# Patient Record
Sex: MALE | Race: WHITE | NOT HISPANIC OR LATINO | Employment: OTHER | RURAL
[De-identification: names, ages, dates, MRNs, and addresses within clinical notes are randomized per-mention and may not be internally consistent; named-entity substitution may affect disease eponyms.]

---

## 2018-05-10 ENCOUNTER — CONVERSION ENCOUNTER (OUTPATIENT)
Dept: CARDIOLOGY | Facility: CLINIC | Age: 63
End: 2018-05-10

## 2018-05-10 ENCOUNTER — OFFICE VISIT CONVERTED (OUTPATIENT)
Dept: CARDIOLOGY | Facility: CLINIC | Age: 63
End: 2018-05-10
Attending: SPECIALIST

## 2018-11-08 ENCOUNTER — OFFICE VISIT CONVERTED (OUTPATIENT)
Dept: CARDIOLOGY | Facility: CLINIC | Age: 63
End: 2018-11-08
Attending: SPECIALIST

## 2019-05-09 ENCOUNTER — OFFICE VISIT CONVERTED (OUTPATIENT)
Dept: CARDIOLOGY | Facility: CLINIC | Age: 64
End: 2019-05-09
Attending: SPECIALIST

## 2019-05-09 ENCOUNTER — CONVERSION ENCOUNTER (OUTPATIENT)
Dept: CARDIOLOGY | Facility: CLINIC | Age: 64
End: 2019-05-09

## 2019-11-21 ENCOUNTER — CONVERSION ENCOUNTER (OUTPATIENT)
Dept: CARDIOLOGY | Facility: CLINIC | Age: 64
End: 2019-11-21

## 2019-11-21 ENCOUNTER — OFFICE VISIT CONVERTED (OUTPATIENT)
Dept: CARDIOLOGY | Facility: CLINIC | Age: 64
End: 2019-11-21
Attending: SPECIALIST

## 2020-09-10 ENCOUNTER — OFFICE VISIT CONVERTED (OUTPATIENT)
Dept: CARDIOLOGY | Facility: CLINIC | Age: 65
End: 2020-09-10
Attending: SPECIALIST

## 2020-09-10 ENCOUNTER — CONVERSION ENCOUNTER (OUTPATIENT)
Dept: OTHER | Facility: HOSPITAL | Age: 65
End: 2020-09-10

## 2021-03-25 ENCOUNTER — CONVERSION ENCOUNTER (OUTPATIENT)
Dept: OTHER | Facility: HOSPITAL | Age: 66
End: 2021-03-25

## 2021-03-25 ENCOUNTER — OFFICE VISIT CONVERTED (OUTPATIENT)
Dept: CARDIOLOGY | Facility: CLINIC | Age: 66
End: 2021-03-25
Attending: SPECIALIST

## 2021-05-13 NOTE — PROGRESS NOTES
"   Progress Note      Patient Name: Chong Ortiz   Patient ID: [id number]   Sex: Male   YOB: 1955    Primary Care Provider: Neeta Staples MD   Referring Provider: Neeta Staples MD    Visit Date: September 10, 2020    Provider: Kalpesh Dallas MD   Location: [facility]   Location Address: [address] [place] [address]  [id number]   Location Phone: [phone]          Chief Complaint  · Atril fibrillation  · Hypertension      History Of Present Illness  Deep Ortiz is a 65 year old /White male with a history of permanent atrial fibrillation. Patient denies chest pain. No shortness of breath, PND or orthopnea. No palpitations. Blood pressure running high recently.   Medications  Meds at present include: Metoprolol 25 mg qd; ASA 81 mg qd.   PAST MEDICAL HISTORY: negative diabetes, hypertension and chest pain.   FAMILY HISTORY: positive for diabetes and hypertension; negative for heart disease.   PSYCHO/SOCIAL HISTORY: negative for depression; he does drink alcohol moderately and does not smoke.       Review of Systems  · Cardiovascular  o Denies  o : palpitations (fast, fluttering, or skipping beats), swelling (feet, ankles, hands), shortness of breath while walking or lying flat, chest pain or angina pectoris   · Respiratory  o Denies  o : chronic or frequent cough, asthma or wheezing      Vitals  Date Time BP Position Site L\R Cuff Size HR RR TEMP (F) WT  HT  BMI kg/m2 BSA m2 O2 Sat HC       09/10/2020 02:24 /102 Sitting    89 - R   219lbs 4oz 5'  10\" 31.46 2.22     09/10/2020 02:24 /97 Sitting    89 - R   219lbs 4oz 5'  10\" 31.46 2.22           Physical Examination  · Constitutional  o Appearance  o : Alert and Oriented X3. The patient is obese.   · Respiratory  o Inspection of Chest  o : No chest wall deformities, moving equal  o Auscultation of Lungs  o : Good air entry with vesicular breath sounds  · Cardiovascular  o Heart  o :   § Auscultation of " Heart  § : S1 and S2 are regular. No S3. No S4. No murmurs.  o Peripheral Vascular System  o :   § Extremities  § : Peripheral pulses were well felt. No edema. No cyanosis.  · Gastrointestinal  o Abdominal Examination  o : No masses or tenderness noted.   · EKG  o EKG  o : was not performed in the office today          Assessment     IMPRESSION/PLAN    1. Permanent atrial firbillation with a controlled heart rate. Chads Vasc score 1.   Patient has developed hypertension with increased Chads Vasc score. I discussed with the patient at length about risks and benefits of  anticoagulation including risk of CVA. He understands the risk and still refuses anticoagulation and will think about it.   Continue current dose of Metoprolol and Aspirin.    2. Essential hypertension uncontrolled.  Monitor his blood pressure regularly and be on a low salt diet. If his blood pressure remains high he will need to be on an antihypertensive agent. He will call me back with his blood pressure readings in the next few weeks.   3. He will see me back in 6 months.      MD SHA Freitas/wt       Plan  · Instructions  o This note was transcribed by Perla Good. SHA/wt  o The above service was transcribed by Perla Good on my behalf and I attest to the accuracy of the note. SHA            Electronically Signed by: Samira Good-, -Author on September 11, 2020 12:33:19 PM  Electronically Co-signed by: Kalpesh Dallas MD -Reviewer on September 12, 2020 09:37:39 AM

## 2021-05-14 VITALS
HEIGHT: 70 IN | DIASTOLIC BLOOD PRESSURE: 102 MMHG | WEIGHT: 219.25 LBS | HEART RATE: 89 BPM | SYSTOLIC BLOOD PRESSURE: 152 MMHG | BODY MASS INDEX: 31.39 KG/M2

## 2021-05-14 VITALS
WEIGHT: 222.25 LBS | SYSTOLIC BLOOD PRESSURE: 136 MMHG | HEIGHT: 73 IN | BODY MASS INDEX: 29.46 KG/M2 | HEART RATE: 77 BPM | DIASTOLIC BLOOD PRESSURE: 92 MMHG

## 2021-05-14 NOTE — PROGRESS NOTES
"   Progress Note      Patient Name: Chong Ortiz   Patient ID: [id number]   Sex: Male   YOB: 1955    Primary Care Provider: Neeta Staples MD   Referring Provider: Neeta Staples MD    Visit Date: March 25, 2021    Provider: Kalpesh Dallas MD   Location: [facility]   Location Address: [address] [place] [address]  [id number]   Location Phone: [phone]          Chief Complaint  · Atril fibrillation  · Hypertension      History Of Present Illness  Deep Ortiz is a 66 year old /White male with a history of permanent atrial fibrillation. Patient denies chest pain. No shortness of breath, PND or orthopnea. No palpitations.   Medications  Meds at present include: Metoprolol 25 mg qd; ASA 81 mg qd; Simvastatin 20 mg qd.   PAST MEDICAL HISTORY: negative diabetes, hypertension and chest pain. Positive for atrial fibrillation, hyperlipidemia.   PSYCHO/SOCIAL HISTORY: he does drink alcohol moderately and does not smoke.       Review of Systems  · Cardiovascular  o Denies  o : palpitations (fast, fluttering, or skipping beats), swelling (feet, ankles, hands), shortness of breath while walking or lying flat, chest pain or angina pectoris   · Respiratory  o Denies  o : chronic or frequent cough, asthma or wheezing      Vitals  Date Time BP Position Site L\R Cuff Size HR RR TEMP (F) WT  HT  BMI kg/m2 BSA m2 O2 Sat FR L/min FiO2 HC       03/25/2021 03:10 /92 Sitting    77 - R   222lbs 4oz 6'  1\" 29.32 2.28             Physical Examination  · Constitutional  o Appearance  o : Alert and Oriented X3. The patient is obese.   · Respiratory  o Inspection of Chest  o : No chest wall deformities, moving equal  o Auscultation of Lungs  o : Good air entry with vesicular breath sounds  · Cardiovascular  o Heart  o :   § Auscultation of Heart  § : S1 and S2 are regular. No S3. No S4. No murmurs.  o Peripheral Vascular System  o :   § Extremities  § : Peripheral pulses were well felt. No " edema. No cyanosis.  · Gastrointestinal  o Abdominal Examination  o : No masses or tenderness noted.   · EKG  o EKG  o : was not performed in the office today          Assessment     IMPRESSION/PLAN    1. Permanent atrial firbillation with a controlled heart rate. Chads Vasc score 1.   I discussed with the patient again at length about risks and benefits of anticoagulation including risk of CVA. He understands the risk and still refuses anticoagulation.   Continue current dose of Metoprolol and Aspirin.    2. Essential hypertension uncontrolled.  Monitor his blood pressure regularly and be on a low salt diet. Continue current dose of Metoprolol.   3. He will see me back in 6 months.      MD SHA Freitas/wt       Plan  · Instructions  o This note was transcribed by Perla Good. SHA/wt  o The above service was transcribed by Perla Good on my behalf and I attest to the accuracy of the note. SHA            Electronically Signed by: Samira Good-, -Author on March 26, 2021 11:44:20 AM  Electronically Co-signed by: Kalpesh Dallas MD -Reviewer on March 29, 2021 09:28:24 AM

## 2021-05-15 VITALS — SYSTOLIC BLOOD PRESSURE: 133 MMHG | HEART RATE: 84 BPM | HEIGHT: 73 IN | DIASTOLIC BLOOD PRESSURE: 97 MMHG

## 2021-05-15 VITALS
HEART RATE: 82 BPM | BODY MASS INDEX: 28.51 KG/M2 | HEIGHT: 73 IN | SYSTOLIC BLOOD PRESSURE: 132 MMHG | WEIGHT: 215.12 LBS | DIASTOLIC BLOOD PRESSURE: 93 MMHG

## 2021-05-16 VITALS
DIASTOLIC BLOOD PRESSURE: 93 MMHG | HEART RATE: 64 BPM | WEIGHT: 205 LBS | BODY MASS INDEX: 27.17 KG/M2 | SYSTOLIC BLOOD PRESSURE: 138 MMHG | HEIGHT: 73 IN

## 2021-05-16 VITALS
SYSTOLIC BLOOD PRESSURE: 139 MMHG | BODY MASS INDEX: 28.01 KG/M2 | HEIGHT: 73 IN | HEART RATE: 108 BPM | WEIGHT: 211.37 LBS | DIASTOLIC BLOOD PRESSURE: 96 MMHG

## 2021-11-03 PROBLEM — I48.21 PERMANENT ATRIAL FIBRILLATION (HCC): Status: ACTIVE | Noted: 2021-11-03

## 2021-11-03 PROBLEM — I10 HYPERTENSION, ESSENTIAL: Status: ACTIVE | Noted: 2021-11-03

## 2021-11-03 NOTE — PROGRESS NOTES
[facility]  Cardiology progress Note    Patient Name: Chong Ortiz  : 1955    CHIEF COMPLAINT  Atrial fibrillation.      Subjective   Subjective     HISTORY OF PRESENT ILLNESS    Chong Ortiz is a 66 y.o. male see of permanent atrial fibrillation.  Denies any chest pain or shortness of breath.  No palpitations.    Review of Systems:   Constitutional no fever,  no weight loss   Skin no rash   Otolaryngeal no difficulty swallowing   Cardiovascular See HPI   Pulmonary no cough, no sputum production   Gastrointestinal no constipation, no diarrhea   Genitourinary no dysuria, no hematuria   Hematologic no easy bruisability, no abnormal bleeding   Musculoskeletal no muscle pain   Neurologic no dizziness, no falls         Personal History     Social History:  reports that he has never smoked. He has never used smokeless tobacco. He reports current alcohol use. He reports that he does not use drugs.    Home Medications:  Current Outpatient Medications on File Prior to Visit   Medication Sig   • aspirin 81 MG EC tablet Take 81 mg by mouth Daily.   • metoprolol succinate XL (TOPROL-XL) 25 MG 24 hr tablet Take 25 mg by mouth Daily.   • simvastatin (ZOCOR) 20 MG tablet Take 20 mg by mouth every night at bedtime.     No current facility-administered medications on file prior to visit.     Allergies:  No Known Allergies    Objective    Objective       Vitals:   Heart Rate:  [79-97] 79  BP: (134-151)/(106-112) 134/106  Body mass index is 29.82 kg/m².     Physical Exam:   Constitutional: Awake, alert, No acute distress    Eyes: PERRLA, sclerae anicteric, no conjunctival injection   HENT: NCAT, mucous membranes moist   Neck: Supple, no thyromegaly, no lymphadenopathy, trachea midline   Respiratory: Clear to auscultation bilaterally, nonlabored respirations    Cardiovascular: S1-S2 irregular, no murmurs or rubs. Palpable pedal pulses bilaterally   Musculoskeletal: No bilateral ankle edema, no cyanosis to  extremities   Psychiatric: Appropriate affect, cooperative   Neurologic: Oriented x 3, strength symmetric in all extremities, Cranial Nerves grossly intact to confrontation, speech clear   Skin: No rashes.    Result Review    Result Review:  I have personally reviewed the available results from  [x]  Laboratory  [x]  EKG  [x]  Cardiology  [x]  Medications  [x]  Old records  []  Other:   Procedures      Impression/Plan:  1.  Permanent atrial fibrillation controlled heart rate: Still refuses anticoagulation.  Discussed with him at length about anticoagulation.  Understands all risks and benefits.  Including the risk of CVA.  Continue Toprol-XL 25 mg once a day.  2.  Essential hypertension controlled: Continue Toprol-XL 25 mg once a day.  Blood pressure well controlled at home.  3.  Hyperlipidemia: Continue simvastatin 20 mg once a day.  Able to tolerate simvastatin without any side effects.        Kalpesh Dallas MD   11/04/21   12:53 EDT

## 2021-11-04 ENCOUNTER — OFFICE VISIT (OUTPATIENT)
Dept: CARDIOLOGY | Facility: CLINIC | Age: 66
End: 2021-11-04

## 2021-11-04 VITALS
DIASTOLIC BLOOD PRESSURE: 106 MMHG | HEIGHT: 73 IN | BODY MASS INDEX: 29.95 KG/M2 | WEIGHT: 226 LBS | HEART RATE: 79 BPM | SYSTOLIC BLOOD PRESSURE: 134 MMHG

## 2021-11-04 DIAGNOSIS — I10 HYPERTENSION, ESSENTIAL: ICD-10-CM

## 2021-11-04 DIAGNOSIS — I48.21 PERMANENT ATRIAL FIBRILLATION (HCC): Primary | ICD-10-CM

## 2021-11-04 PROCEDURE — 99214 OFFICE O/P EST MOD 30 MIN: CPT | Performed by: SPECIALIST

## 2021-11-04 RX ORDER — SIMVASTATIN 20 MG
20 TABLET ORAL
COMMUNITY
Start: 2021-10-04

## 2021-11-04 RX ORDER — METOPROLOL SUCCINATE 25 MG/1
25 TABLET, EXTENDED RELEASE ORAL DAILY
COMMUNITY
End: 2022-03-09 | Stop reason: SDUPTHER

## 2021-11-04 RX ORDER — ASPIRIN 81 MG/1
81 TABLET ORAL DAILY
COMMUNITY
End: 2023-02-23 | Stop reason: ALTCHOICE

## 2022-03-09 RX ORDER — METOPROLOL SUCCINATE 25 MG/1
25 TABLET, EXTENDED RELEASE ORAL DAILY
Qty: 90 TABLET | Refills: 2 | Status: SHIPPED | OUTPATIENT
Start: 2022-03-09 | End: 2022-06-08 | Stop reason: SDUPTHER

## 2022-06-08 RX ORDER — METOPROLOL SUCCINATE 25 MG/1
25 TABLET, EXTENDED RELEASE ORAL DAILY
Qty: 90 TABLET | Refills: 0 | Status: SHIPPED | OUTPATIENT
Start: 2022-06-08

## 2022-06-20 NOTE — PROGRESS NOTES
[facility]  Cardiology progress Note    Patient Name: Chong Ortiz  : 1955    CHIEF COMPLAINT  Atrial fibrillation      Subjective   Subjective     HISTORY OF PRESENT ILLNESS    Chong Ortiz is a 67 y.o. male with history of atrial fibrillation.  No palpitations.  No chest pain    Review of Systems:   Constitutional no fever,  no weight loss   Skin no rash   Otolaryngeal no difficulty swallowing   Cardiovascular See HPI   Pulmonary no cough, no sputum production   Gastrointestinal no constipation, no diarrhea   Genitourinary no dysuria, no hematuria   Hematologic no easy bruisability, no abnormal bleeding   Musculoskeletal no muscle pain   Neurologic no dizziness, no falls         Personal History     Social History:  reports that he has never smoked. He has never used smokeless tobacco. He reports current alcohol use. He reports that he does not use drugs.    Home Medications:  Current Outpatient Medications on File Prior to Visit   Medication Sig   • aspirin 81 MG EC tablet Take 81 mg by mouth Daily.   • metoprolol succinate XL (TOPROL-XL) 25 MG 24 hr tablet Take 1 tablet by mouth Daily.   • simvastatin (ZOCOR) 20 MG tablet Take 20 mg by mouth every night at bedtime.     No current facility-administered medications on file prior to visit.     Allergies:  No Known Allergies    Objective    Objective       Vitals:   Heart Rate:  [96] 96  BP: (136)/(96) 136/96  Body mass index is 28.37 kg/m².     Physical Exam:   Constitutional: Awake, alert, No acute distress    Eyes: PERRLA, sclerae anicteric, no conjunctival injection   HENT: NCAT, mucous membranes moist   Neck: Supple, no thyromegaly, no lymphadenopathy, trachea midline   Respiratory: Clear to auscultation bilaterally, nonlabored respirations    Cardiovascular: Heart sounds irregular, no murmurs or rubs. Palpable pedal pulses bilaterally   Musculoskeletal: No bilateral ankle edema, no cyanosis to extremities   Psychiatric: Appropriate  affect, cooperative   Neurologic: Oriented x 3, strength symmetric in all extremities, Cranial Nerves grossly intact to confrontation, speech clear   Skin: No rashes.    Result Review    Result Review:  I have personally reviewed the available results from  [x]  Laboratory  [x]  EKG  [x]  Cardiology  [x]  Medications  [x]  Old records  []  Other:   Procedures    Impression/Plan:  1.  Permanent atrial fibrillation controlled heart rate: Still refuses anticoagulation.  Discussed with him at length about anticoagulation.  Understands all risks and benefits.  Including the risk of CVA.  Continue Toprol-XL 25 mg once a day.  Continue aspirin 81 mg once a day.  2.  Essential hypertension controlled: Continue Toprol-XL 25 mg once a day.  Blood pressure well controlled at home.  3.  Hyperlipidemia: Continue simvastatin 20 mg once a day.  Able to tolerate simvastatin without any side effects.           Kalpesh Dallas MD   06/23/22   10:51 EDT

## 2022-06-23 ENCOUNTER — OFFICE VISIT (OUTPATIENT)
Dept: CARDIOLOGY | Facility: CLINIC | Age: 67
End: 2022-06-23

## 2022-06-23 VITALS
HEART RATE: 96 BPM | BODY MASS INDEX: 28.49 KG/M2 | HEIGHT: 73 IN | DIASTOLIC BLOOD PRESSURE: 96 MMHG | SYSTOLIC BLOOD PRESSURE: 136 MMHG | WEIGHT: 215 LBS

## 2022-06-23 DIAGNOSIS — I10 HYPERTENSION, ESSENTIAL: ICD-10-CM

## 2022-06-23 DIAGNOSIS — I48.21 PERMANENT ATRIAL FIBRILLATION: Primary | ICD-10-CM

## 2022-06-23 DIAGNOSIS — E78.2 HYPERLIPEMIA, MIXED: ICD-10-CM

## 2022-06-23 PROCEDURE — 99214 OFFICE O/P EST MOD 30 MIN: CPT | Performed by: SPECIALIST

## 2022-08-02 ENCOUNTER — TELEPHONE (OUTPATIENT)
Dept: CARDIOLOGY | Facility: CLINIC | Age: 67
End: 2022-08-02

## 2023-02-20 NOTE — PROGRESS NOTES
[facility]  Cardiology progress Note    Patient Name: Chong Ortiz  : 1955    CHIEF COMPLAINT  Atrial fibrillation        Subjective   Subjective     HISTORY OF PRESENT ILLNESS    hCong Ortiz is a 67 y.o. male with history of permanent atrial fibrillation.  No chest pain or shortness of breath.    REVIEW OF SYSTEMS    Constitutional:    No fever, no weight loss  Skin:     No rash  Otolaryngeal:    No difficulty swallowing  Cardiovascular: See HPI.  Pulmonary:    No cough, no sputum production    Personal History     Social History:    reports that he has never smoked. He has never used smokeless tobacco. He reports current alcohol use. He reports that he does not use drugs.    Home Medications:  Current Outpatient Medications on File Prior to Visit   Medication Sig   • apixaban (ELIQUIS) 5 MG tablet tablet Take 1 tablet by mouth 2 (Two) Times a Day.   • metoprolol succinate XL (TOPROL-XL) 25 MG 24 hr tablet Take 1 tablet by mouth Daily. (Patient taking differently: Take  by mouth Daily.)   • simvastatin (ZOCOR) 20 MG tablet Take 20 mg by mouth every night at bedtime.   • [DISCONTINUED] aspirin 81 MG EC tablet Take 81 mg by mouth Daily.     No current facility-administered medications on file prior to visit.       Past Medical History:   Diagnosis Date   • Atrial fibrillation (HCC)    • Hyperlipidemia    • Hypertension        Allergies:  No Known Allergies    Objective    Objective       Vitals:   Heart Rate:  [68] 68  BP: (136)/(92) 136/92  Body mass index is 28.63 kg/m².     PHYSICAL EXAM:    General Appearance:   · well developed  · well nourished  HENT:   · oropharynx moist  · lips not cyanotic  Neck:  · thyroid not enlarged  · supple  Respiratory:  · no respiratory distress  · normal breath sounds  · no rales  Cardiovascular:  · no jugular venous distention  · regular rhythm  · apical impulse normal  · S1 normal, S2 normal  · no S3, no S4   · no murmur  · no rub, no thrill  · carotid  pulses normal; no bruit  · pedal pulses normal  · lower extremity edema: none    Skin:   · warm, dry  Psychiatric:  · judgement and insight appropriate  · normal mood and affect        Result Review:  I have personally reviewed the available results from  [x]  Laboratory  [x]  EKG  [x]  Cardiology  [x]  Medications  [x]  Old records  []  Other:     Procedures    Impression/Plan:  1.  Permanent atrial fibrillation with a controlled heart rate: Continue Eliquis 5 mg twice a day for stroke prevention.  Continue Toprol-XL 25 mg once a day.  Able to tolerate Eliquis 20 side effect such as bleeding.  2.  Essential hypertension controlled: Continue Toprol-XL 25 mg once a day.  Monitor blood pressure regularly.  3.  Hyperlipidemia: Continue simvastatin 20 mg once a day.           Kalpesh Dallas MD   02/23/23   11:53 EST

## 2023-02-23 ENCOUNTER — OFFICE VISIT (OUTPATIENT)
Dept: CARDIOLOGY | Facility: CLINIC | Age: 68
End: 2023-02-23
Payer: MEDICARE

## 2023-02-23 VITALS
SYSTOLIC BLOOD PRESSURE: 136 MMHG | HEIGHT: 73 IN | WEIGHT: 217 LBS | BODY MASS INDEX: 28.76 KG/M2 | DIASTOLIC BLOOD PRESSURE: 92 MMHG | HEART RATE: 68 BPM

## 2023-02-23 DIAGNOSIS — I48.21 PERMANENT ATRIAL FIBRILLATION: ICD-10-CM

## 2023-02-23 DIAGNOSIS — E78.2 HYPERLIPEMIA, MIXED: ICD-10-CM

## 2023-02-23 DIAGNOSIS — I10 HYPERTENSION, ESSENTIAL: Primary | ICD-10-CM

## 2023-02-23 PROCEDURE — 99214 OFFICE O/P EST MOD 30 MIN: CPT | Performed by: SPECIALIST

## 2023-08-25 NOTE — PROGRESS NOTES
[facility]  Cardiology progress Note    Patient Name: Chong Ortiz  : 1955    CHIEF COMPLAINT  Atrial fibrillation        Subjective   Subjective     HISTORY OF PRESENT ILLNESS    Chong Ortiz is a 68 y.o. male with history of atrial fibrillation hypertension.  No palpitations.    REVIEW OF SYSTEMS    Constitutional:    No fever, no weight loss  Skin:     No rash  Otolaryngeal:    No difficulty swallowing  Cardiovascular: See HPI.  Pulmonary:    No cough, no sputum production    Personal History     Social History:    reports that he has never smoked. He has never used smokeless tobacco. He reports current alcohol use. He reports that he does not use drugs.    Home Medications:  Current Outpatient Medications on File Prior to Visit   Medication Sig    apixaban (ELIQUIS) 5 MG tablet tablet Take 1 tablet by mouth 2 (Two) Times a Day.    metoprolol succinate XL (TOPROL-XL) 25 MG 24 hr tablet Take 1 tablet by mouth Daily. (Patient taking differently: Take  by mouth Daily.)    simvastatin (ZOCOR) 20 MG tablet Take 1 tablet by mouth every night at bedtime.     No current facility-administered medications on file prior to visit.       Past Medical History:   Diagnosis Date    Atrial fibrillation     Hyperlipidemia     Hypertension        Allergies:  No Known Allergies    Objective    Objective       Vitals:   Heart Rate:  [79] 79  BP: (122)/(94) 122/94  Body mass index is 28.63 kg/mý.     PHYSICAL EXAM:    General Appearance:   well developed  well nourished  HENT:   oropharynx moist  lips not cyanotic  Neck:  thyroid not enlarged  supple  Respiratory:  no respiratory distress  normal breath sounds  no rales  Cardiovascular:  no jugular venous distention  regular rhythm  apical impulse normal  S1 normal, S2 normal  no S3, no S4   no murmur  no rub, no thrill  carotid pulses normal; no bruit  pedal pulses normal  lower extremity edema: none    Skin:   warm, dry  Psychiatric:  judgement and insight  appropriate  normal mood and affect        Result Review:  I have personally reviewed the available results from  [x]  Laboratory  [x]  EKG  [x]  Cardiology  [x]  Medications  [x]  Old records  []  Other:     Procedures       Impression/Plan:  1.  Essential hypertension controlled: Continue Toprol-XL 25 mg once a day.  Monitor blood pressure regularly.  2.  Hyperlipidemia: Continue simvastatin 20 mg once a day.  Monitor lipid and hepatic profile.  3.  Permanent atrial fibrillation with a controlled heart rate: Continue Eliquis 5 mg twice a day for stroke prevention.  Continue Toprol-XL 25 mg once a day.  To tolerate these medicines without any side effects.  No history of bleeding.           Kalpesh Dallas MD   08/28/23   12:54 EDT

## 2023-08-28 ENCOUNTER — OFFICE VISIT (OUTPATIENT)
Dept: CARDIOLOGY | Facility: CLINIC | Age: 68
End: 2023-08-28
Payer: MEDICARE

## 2023-08-28 VITALS
WEIGHT: 217 LBS | DIASTOLIC BLOOD PRESSURE: 94 MMHG | HEART RATE: 79 BPM | HEIGHT: 73 IN | SYSTOLIC BLOOD PRESSURE: 122 MMHG | BODY MASS INDEX: 28.76 KG/M2

## 2023-08-28 DIAGNOSIS — I10 HYPERTENSION, ESSENTIAL: Primary | ICD-10-CM

## 2023-08-28 DIAGNOSIS — I48.21 PERMANENT ATRIAL FIBRILLATION: ICD-10-CM

## 2023-08-28 DIAGNOSIS — E78.2 HYPERLIPEMIA, MIXED: ICD-10-CM

## 2023-08-28 PROCEDURE — 99214 OFFICE O/P EST MOD 30 MIN: CPT | Performed by: SPECIALIST

## 2023-08-28 PROCEDURE — 1159F MED LIST DOCD IN RCRD: CPT | Performed by: SPECIALIST

## 2023-08-28 PROCEDURE — 3080F DIAST BP >= 90 MM HG: CPT | Performed by: SPECIALIST

## 2023-08-28 PROCEDURE — 3074F SYST BP LT 130 MM HG: CPT | Performed by: SPECIALIST

## 2023-08-28 PROCEDURE — 1160F RVW MEDS BY RX/DR IN RCRD: CPT | Performed by: SPECIALIST

## 2023-09-12 RX ORDER — APIXABAN 5 MG/1
TABLET, FILM COATED ORAL
Qty: 180 TABLET | Refills: 3 | Status: SHIPPED | OUTPATIENT
Start: 2023-09-12

## 2024-03-11 NOTE — PROGRESS NOTES
[facility]  Cardiology progress Note    Patient Name: Chong Ortiz  : 1955    CHIEF COMPLAINT  Atrial fibrillation        Subjective   Subjective     HISTORY OF PRESENT ILLNESS    Chong Ortiz is a 68 y.o. male with history of atrial fibrillation and hypertension.  No palpitations    REVIEW OF SYSTEMS    Constitutional:    No fever, no weight loss  Skin:     No rash  Otolaryngeal:    No difficulty swallowing  Cardiovascular: See HPI.  Pulmonary:    No cough, no sputum production    Personal History     Social History:    reports that he has never smoked. He has never used smokeless tobacco. He reports current alcohol use. He reports that he does not use drugs.    Home Medications:  Current Outpatient Medications on File Prior to Visit   Medication Sig    Eliquis 5 MG tablet tablet TAKE ONE TABLET BY MOUTH TWICE DAILY    metoprolol succinate XL (TOPROL-XL) 25 MG 24 hr tablet Take 1 tablet by mouth Daily. (Patient taking differently: Take  by mouth Daily.)    simvastatin (ZOCOR) 20 MG tablet Take 1 tablet by mouth every night at bedtime.     No current facility-administered medications on file prior to visit.       Past Medical History:   Diagnosis Date    Atrial fibrillation     Hyperlipidemia     Hypertension        Allergies:  No Known Allergies    Objective    Objective       Vitals:   Heart Rate:  [66] 66  BP: (114)/(75) 114/75  Body mass index is 29.69 kg/m².     PHYSICAL EXAM:    General Appearance:   well developed  well nourished  HENT:   oropharynx moist  lips not cyanotic  Neck:  thyroid not enlarged  supple  Respiratory:  no respiratory distress  normal breath sounds  no rales  Cardiovascular:  no jugular venous distention  regular rhythm  apical impulse normal  S1 normal, S2 normal  no S3, no S4   no murmur  no rub, no thrill  carotid pulses normal; no bruit  pedal pulses normal  lower extremity edema: none    Skin:   warm, dry  Psychiatric:  judgement and insight  appropriate  normal mood and affect        Result Review:  I have personally reviewed the available results from  [x]  Laboratory  [x]  EKG  [x]  Cardiology  [x]  Medications  [x]  Old records  []  Other:     Procedures    Impression/Plan:  1.  Permanent atrial fibrillation controlled: Continue Eliquis 5 mg twice a day for stroke prevention.    Continue Toprol-XL 25 mg once a day.  2.  Mixed hyperlipidemia: Continue simvastatin 20 mg once a day.  Monitor lipid and hepatic profile.  3.  Essential hypertension controlled: Continue Toprol-XL 25 mg once a day.  Monitor blood pressure daily.           Kalpesh Dallas MD   03/14/24   13:28 EDT

## 2024-03-12 ENCOUNTER — TELEPHONE (OUTPATIENT)
Dept: CARDIOLOGY | Facility: CLINIC | Age: 69
End: 2024-03-12
Payer: MEDICARE

## 2024-03-12 NOTE — TELEPHONE ENCOUNTER
RECEIVED JU FROM JENNY WITH [facility] THAT PATIENT HAS BEEN AUTHORIZED FOR CONTINUITY OF CARE WITH DR. CARDONA. AUTH NUM [id number]. ANY QUESTIONS CAN BE DIRECTED [phone].

## 2024-03-14 ENCOUNTER — OFFICE VISIT (OUTPATIENT)
Dept: CARDIOLOGY | Facility: CLINIC | Age: 69
End: 2024-03-14
Payer: MEDICARE

## 2024-03-14 VITALS
WEIGHT: 225 LBS | BODY MASS INDEX: 29.82 KG/M2 | HEART RATE: 66 BPM | DIASTOLIC BLOOD PRESSURE: 75 MMHG | HEIGHT: 73 IN | SYSTOLIC BLOOD PRESSURE: 114 MMHG

## 2024-03-14 DIAGNOSIS — I10 HYPERTENSION, ESSENTIAL: Primary | ICD-10-CM

## 2024-03-14 DIAGNOSIS — I48.21 PERMANENT ATRIAL FIBRILLATION: ICD-10-CM

## 2024-03-14 DIAGNOSIS — E78.2 HYPERLIPEMIA, MIXED: ICD-10-CM

## 2024-03-14 PROCEDURE — 99214 OFFICE O/P EST MOD 30 MIN: CPT | Performed by: SPECIALIST

## 2024-03-14 PROCEDURE — 3074F SYST BP LT 130 MM HG: CPT | Performed by: SPECIALIST

## 2024-03-14 PROCEDURE — 3078F DIAST BP <80 MM HG: CPT | Performed by: SPECIALIST

## 2024-03-14 PROCEDURE — 1160F RVW MEDS BY RX/DR IN RCRD: CPT | Performed by: SPECIALIST

## 2024-03-14 PROCEDURE — 1159F MED LIST DOCD IN RCRD: CPT | Performed by: SPECIALIST

## 2024-09-16 ENCOUNTER — OFFICE VISIT (OUTPATIENT)
Dept: CARDIOLOGY | Facility: CLINIC | Age: 69
End: 2024-09-16
Payer: MEDICARE

## 2024-09-16 VITALS
WEIGHT: 223 LBS | DIASTOLIC BLOOD PRESSURE: 92 MMHG | HEART RATE: 70 BPM | HEIGHT: 73 IN | SYSTOLIC BLOOD PRESSURE: 144 MMHG | BODY MASS INDEX: 29.55 KG/M2

## 2024-09-16 DIAGNOSIS — I48.21 PERMANENT ATRIAL FIBRILLATION: ICD-10-CM

## 2024-09-16 DIAGNOSIS — I10 HYPERTENSION, ESSENTIAL: Primary | ICD-10-CM

## 2024-09-16 DIAGNOSIS — E78.2 HYPERLIPEMIA, MIXED: ICD-10-CM

## 2024-09-16 PROCEDURE — 1160F RVW MEDS BY RX/DR IN RCRD: CPT | Performed by: SPECIALIST

## 2024-09-16 PROCEDURE — 1159F MED LIST DOCD IN RCRD: CPT | Performed by: SPECIALIST

## 2024-09-16 PROCEDURE — 3077F SYST BP >= 140 MM HG: CPT | Performed by: SPECIALIST

## 2024-09-16 PROCEDURE — 3080F DIAST BP >= 90 MM HG: CPT | Performed by: SPECIALIST

## 2024-09-16 PROCEDURE — 99214 OFFICE O/P EST MOD 30 MIN: CPT | Performed by: SPECIALIST

## 2024-09-16 RX ORDER — METOPROLOL SUCCINATE 25 MG/1
25 TABLET, EXTENDED RELEASE ORAL DAILY
Qty: 90 TABLET | Refills: 3 | Status: SHIPPED | OUTPATIENT
Start: 2024-09-16

## 2025-01-16 NOTE — TELEPHONE ENCOUNTER
Caller: Chong Ortiz    Relationship: Self    Best call back number: [phone]     Requested Prescriptions:   Requested Prescriptions     Pending Prescriptions Disp Refills    apixaban (Eliquis) 5 MG tablet tablet 180 tablet 3     Sig: Take 1 tablet by mouth 2 (Two) Times a Day.        Pharmacy where request should be sent: [facility] [address] - [phone]  - [phone] FX     Last office visit with prescribing clinician: 9/16/2024   Last telemedicine visit with prescribing clinician: Visit date not found   Next office visit with prescribing clinician: 3/31/2025     Additional details provided by patient: PATIENT STATES THE PHARMACY SENT OVER A REQUEST FOR A SCRIPT BUT WAS UNSUCCESSFUL. PATIENT IS COMPLETELY OUT OF MEDICATION.    Does the patient have less than a 3 day supply:  [x] Yes  [] No    Would you like a call back once the refill request has been completed: [x] Yes [] No    If the office needs to give you a call back, can they leave a voicemail: [x] Yes [] No    Quyen Hughes Rep   01/16/25 15:45 EST     PUT ONE IN EARLIER BUT I ACCIDENTALLY SENT IT TO THE WRONG OFFICE.

## 2025-01-17 NOTE — TELEPHONE ENCOUNTER
Please send a 90-day supply without refills, and check and see if her PCP has a CBC and BMP ( or CMP), and get copies.  If no CBC or BMP in the last year, then have her have 1 drawn approximately a week before her appointment in March.

## 2025-01-17 NOTE — TELEPHONE ENCOUNTER
Contacted pcp.  Pcp has recent CBC from October 2024 on file and are faxing over to us.  Will send in 90 day supply to get patient to upcoming in office appointment per TULIO Sanderson.

## 2025-01-17 NOTE — TELEPHONE ENCOUNTER
No cbc or eGFR on file. Medication last filled on 9/12/23 for 1 year supply, patient was last seen on 9/16/24. Please advise.

## 2025-01-22 ENCOUNTER — TELEPHONE (OUTPATIENT)
Dept: CARDIOLOGY | Facility: CLINIC | Age: 70
End: 2025-01-22
Payer: MEDICARE

## 2025-01-22 DIAGNOSIS — I10 HYPERTENSION, ESSENTIAL: Primary | ICD-10-CM

## 2025-01-22 NOTE — TELEPHONE ENCOUNTER
----- Message from Klarissa Avendaño sent at 1/22/2025  8:01 AM EST -----  Potassium was very elevated back in October, he should have this rechecked to make sure now in normal range  ----- Message -----  From: Klarissa Avendaño APRN  Sent: 1/20/2025   8:38 AM EST  To: TULIO Harrington      ----- Message -----  From: Clary Baltazar RegSched Rep  Sent: 1/20/2025   8:18 AM EST  To: Kalpesh Dallas MD

## 2025-01-22 NOTE — TELEPHONE ENCOUNTER
[facility] patient and doctor office. Patient has not had labs rechecked since October. Educated patient on the importance of getting potassium level checked. Patient verbalized understanding and request order be sent to Dr. Staples office. Order faxed.

## 2025-01-29 ENCOUNTER — TELEPHONE (OUTPATIENT)
Dept: CARDIOLOGY | Facility: CLINIC | Age: 70
End: 2025-01-29
Payer: MEDICARE

## 2025-01-29 DIAGNOSIS — E87.5 HYPERKALEMIA: Primary | ICD-10-CM

## 2025-01-29 NOTE — TELEPHONE ENCOUNTER
----- Message from Klarissa Avendaño sent at 1/29/2025 12:54 PM EST -----  Find out if he takes over the counter supplement that contains potassium. Discuss foods that are high in potassium. Repeat BMP in 3-4 weeks  ----- Message -----  From: Brenna Santana MA  Sent: 1/28/2025   9:27 AM EST  To: TULIO Harrington      ----- Message -----  From: Sara Villalta RegSched Rep  Sent: 1/28/2025   9:14 AM EST  To: [facility] [place] [place] Card [facility]

## 2025-01-30 NOTE — TELEPHONE ENCOUNTER
MORELIA patient regarding results and recommendations. Voiced understanding. Advised on foods that are high in potassium. Patient reports he consumes multiple foods that are high in potassium and will cut back on his consumption. Lab order to be faxed to [facility].

## 2025-03-27 NOTE — PROGRESS NOTES
[facility]  Cardiology progress Note    Patient Name: Chong Ortiz  : 1955    CHIEF COMPLAINT  Hypertension        Subjective   Subjective     HISTORY OF PRESENT ILLNESS    Chong Ortiz is a 70 y.o. male with history of hypertension.  No chest pain.    REVIEW OF SYSTEMS    Constitutional:    No fever, no weight loss  Skin:     No rash  Otolaryngeal:    No difficulty swallowing  Cardiovascular: See HPI.  Pulmonary:    No cough, no sputum production    Personal History     Social History:    reports that he has never smoked. He has never used smokeless tobacco. He reports current alcohol use. He reports that he does not use drugs.    Home Medications:  Current Outpatient Medications on File Prior to Visit   Medication Sig    apixaban (Eliquis) 5 MG tablet tablet Take 1 tablet by mouth 2 (Two) Times a Day.    metoprolol succinate XL (TOPROL-XL) 25 MG 24 hr tablet Take 1 tablet by mouth Daily.    simvastatin (ZOCOR) 20 MG tablet Take 1 tablet by mouth every night at bedtime.     No current facility-administered medications on file prior to visit.       Past Medical History:   Diagnosis Date    Atrial fibrillation     Hyperlipidemia     Hypertension        Allergies:  No Known Allergies    Objective    Objective       Vitals:   Heart Rate:  [81] 81  BP: (145)/(103) 145/103  Body mass index is 29.82 kg/m².     PHYSICAL EXAM:    General Appearance:   well developed  well nourished  HENT:   oropharynx moist  lips not cyanotic  Neck:  thyroid not enlarged  supple  Respiratory:  no respiratory distress  normal breath sounds  no rales  Cardiovascular:  no jugular venous distention  regular rhythm  apical impulse normal  S1 normal, S2 normal  no S3, no S4   no murmur  no rub, no thrill  carotid pulses normal; no bruit  pedal pulses normal  lower extremity edema: none    Skin:   warm, dry  Psychiatric:  judgement and insight appropriate  normal mood and affect        Result Review:  I have personally  reviewed the available results from  [x]  Laboratory  [x]  EKG  [x]  Cardiology  [x]  Medications  [x]  Old records  []  Other:     Procedures       Impression/Plan:  1.  Essential hypertension controlled: Continue Toprol-XL 25 mg once a day.  Monitor blood pressure regularly.  2.  Mixed hyperlipidemia: Continue simvastatin 20 mg once a day.  Monitor lipid and hepatic profile.  3.  Permanent atrial fibrillation controlled: Continue Eliquis 5 mg twice a day for stroke prevention.  Continue Toprol-XL 25 mg once a day.  Echocardiogram.                    Kalpesh Dallas MD   03/31/25   11:15 EDT

## 2025-03-31 ENCOUNTER — OFFICE VISIT (OUTPATIENT)
Dept: CARDIOLOGY | Facility: CLINIC | Age: 70
End: 2025-03-31
Payer: MEDICARE

## 2025-03-31 VITALS
DIASTOLIC BLOOD PRESSURE: 103 MMHG | WEIGHT: 226 LBS | BODY MASS INDEX: 29.95 KG/M2 | SYSTOLIC BLOOD PRESSURE: 145 MMHG | HEART RATE: 81 BPM | HEIGHT: 73 IN

## 2025-03-31 DIAGNOSIS — I48.21 PERMANENT ATRIAL FIBRILLATION: ICD-10-CM

## 2025-03-31 DIAGNOSIS — I10 HYPERTENSION, ESSENTIAL: Primary | ICD-10-CM

## 2025-03-31 DIAGNOSIS — E78.2 HYPERLIPEMIA, MIXED: ICD-10-CM

## 2025-03-31 PROCEDURE — 1159F MED LIST DOCD IN RCRD: CPT | Performed by: SPECIALIST

## 2025-03-31 PROCEDURE — 3077F SYST BP >= 140 MM HG: CPT | Performed by: SPECIALIST

## 2025-03-31 PROCEDURE — 3080F DIAST BP >= 90 MM HG: CPT | Performed by: SPECIALIST

## 2025-03-31 PROCEDURE — 1160F RVW MEDS BY RX/DR IN RCRD: CPT | Performed by: SPECIALIST

## 2025-03-31 PROCEDURE — 99214 OFFICE O/P EST MOD 30 MIN: CPT | Performed by: SPECIALIST

## 2025-04-03 ENCOUNTER — TELEPHONE (OUTPATIENT)
Dept: CARDIOLOGY | Facility: CLINIC | Age: 70
End: 2025-04-03
Payer: MEDICARE

## 2025-04-10 ENCOUNTER — HOSPITAL ENCOUNTER (OUTPATIENT)
Facility: HOSPITAL | Age: 70
Discharge: HOME | End: 2025-04-10
Admitting: SPECIALIST
Payer: MEDICARE

## 2025-04-10 DIAGNOSIS — I48.21 PERMANENT ATRIAL FIBRILLATION: ICD-10-CM

## 2025-04-10 LAB
AORTIC ARCH: 3.6 CM
AORTIC DIMENSIONLESS INDEX: 0.68 (DI)
ASCENDING AORTA: 4.5 CM
AV MEAN PRESS GRAD SYS DOP V1V2: 2.8 MMHG
AV VMAX SYS DOP: 106 CM/SEC
BH CV ECHO MEAS - AO MAX PG: 4.5 MMHG
BH CV ECHO MEAS - AO ROOT DIAM: 4.1 CM
BH CV ECHO MEAS - AO V2 VTI: 20.6 CM
BH CV ECHO MEAS - AVA(I,D): 2.8 CM2
BH CV ECHO MEAS - EDV(MOD-SP2): 92.4 ML
BH CV ECHO MEAS - EDV(MOD-SP4): 99 ML
BH CV ECHO MEAS - EF(MOD-SP2): 56.5 %
BH CV ECHO MEAS - EF(MOD-SP4): 60 %
BH CV ECHO MEAS - ESV(MOD-SP2): 40.2 ML
BH CV ECHO MEAS - ESV(MOD-SP4): 39.6 ML
BH CV ECHO MEAS - IVS/LVPW: 0.91 CM
BH CV ECHO MEAS - IVSD: 1 CM
BH CV ECHO MEAS - LA DIMENSION: 4.8 CM
BH CV ECHO MEAS - LV DIASTOLIC VOL/BSA (35-75): 43.8 CM2
BH CV ECHO MEAS - LV MAX PG: 1.96 MMHG
BH CV ECHO MEAS - LV MEAN PG: 1.2 MMHG
BH CV ECHO MEAS - LV SYSTOLIC VOL/BSA (12-30): 17.5 CM2
BH CV ECHO MEAS - LV V1 MAX: 70 CM/SEC
BH CV ECHO MEAS - LV V1 VTI: 14.1 CM
BH CV ECHO MEAS - LVIDD: 4.5 CM
BH CV ECHO MEAS - LVIDS: 2.9 CM
BH CV ECHO MEAS - LVOT AREA: 4.2 CM2
BH CV ECHO MEAS - LVOT DIAM: 2.3 CM
BH CV ECHO MEAS - LVPWD: 1.1 CM
BH CV ECHO MEAS - SUP REN AO DIAM: 2.7 CM
BH CV ECHO MEAS - SV(LVOT): 58.5 ML
BH CV ECHO MEAS - SV(MOD-SP2): 52.2 ML
BH CV ECHO MEAS - SV(MOD-SP4): 59.4 ML
BH CV ECHO MEAS - SVI(LVOT): 25.9 ML/M2
BH CV ECHO MEAS - SVI(MOD-SP2): 23.1 ML/M2
BH CV ECHO MEAS - SVI(MOD-SP4): 26.3 ML/M2
BH CV ECHO MEAS - TAPSE (>1.6): 2.14 CM
BH CV ECHO MEAS - TR MAX PG: 24 MMHG
BH CV ECHO MEAS - TR MAX VEL: 247 CM/SEC
BH CV XLRA - RV BASE: 4.3 CM
BH CV XLRA - TDI S': 13.5 CM/SEC
LEFT ATRIUM VOLUME INDEX: 36.2 ML/M2
SINUS: 3.2 CM

## 2025-04-10 PROCEDURE — 93306 TTE W/DOPPLER COMPLETE: CPT

## 2025-04-11 ENCOUNTER — RESULTS FOLLOW-UP (OUTPATIENT)
Dept: CARDIOLOGY | Facility: CLINIC | Age: 70
End: 2025-04-11
Payer: MEDICARE

## 2025-04-11 DIAGNOSIS — I10 HYPERTENSION, ESSENTIAL: Primary | ICD-10-CM

## 2025-04-11 NOTE — TELEPHONE ENCOUNTER
Attempted to call patient. No answer. VM left with return call requested.     
SW patient regarding results and recommendations. Voiced understanding.    
[Follow-Up: _____] : a [unfilled] follow-up visit

## 2025-04-17 ENCOUNTER — TELEPHONE (OUTPATIENT)
Dept: CARDIOLOGY | Facility: CLINIC | Age: 70
End: 2025-04-17
Payer: MEDICARE

## 2025-04-17 NOTE — TELEPHONE ENCOUNTER
Patient called asking if Chest CT can be done at [facility]? He would like a call back if the order will be sent there.

## 2025-05-01 DIAGNOSIS — I10 HYPERTENSION, ESSENTIAL: ICD-10-CM

## 2025-05-02 ENCOUNTER — RESULTS FOLLOW-UP (OUTPATIENT)
Dept: CARDIOLOGY | Facility: CLINIC | Age: 70
End: 2025-05-02
Payer: MEDICARE

## 2025-05-02 DIAGNOSIS — I77.810 AORTIC ROOT DILATION: Primary | ICD-10-CM
